# Patient Record
Sex: MALE | Race: WHITE
[De-identification: names, ages, dates, MRNs, and addresses within clinical notes are randomized per-mention and may not be internally consistent; named-entity substitution may affect disease eponyms.]

---

## 2018-08-23 ENCOUNTER — HOSPITAL ENCOUNTER (EMERGENCY)
Dept: HOSPITAL 80 - FED | Age: 43
Discharge: HOME | End: 2018-08-23
Payer: SELF-PAY

## 2018-08-23 VITALS — SYSTOLIC BLOOD PRESSURE: 125 MMHG | DIASTOLIC BLOOD PRESSURE: 81 MMHG

## 2018-08-23 DIAGNOSIS — F41.9: ICD-10-CM

## 2018-08-23 DIAGNOSIS — R07.9: Primary | ICD-10-CM

## 2018-08-23 LAB — PLATELET # BLD: 305 10^3/UL (ref 150–400)

## 2018-08-23 NOTE — CPEKG
Test Reason : OPEN

Blood Pressure : ***/*** mmHG

Vent. Rate : 064 BPM     Atrial Rate : 065 BPM

   P-R Int : 138 ms          QRS Dur : 099 ms

    QT Int : 413 ms       P-R-T Axes : 012 028 026 degrees

   QTc Int : 426 ms

 

Sinus rhythm

 

Confirmed by Miladys Beltre ([id number]) on 8/23/2018 9:02:29 PM

 

Referred By:             Confirmed By:Miladys Beltre
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
## 2019-03-04 ENCOUNTER — HOSPITAL ENCOUNTER (EMERGENCY)
Dept: HOSPITAL 80 - FED | Age: 44
Discharge: HOME | End: 2019-03-04
Payer: COMMERCIAL